# Patient Record
Sex: FEMALE | Race: WHITE
[De-identification: names, ages, dates, MRNs, and addresses within clinical notes are randomized per-mention and may not be internally consistent; named-entity substitution may affect disease eponyms.]

---

## 2020-07-15 ENCOUNTER — HOSPITAL ENCOUNTER (OUTPATIENT)
Dept: HOSPITAL 47 - EC | Age: 35
Setting detail: OBSERVATION
LOS: 2 days | Discharge: LEFT BEFORE DISCHARGE | End: 2020-07-17
Attending: INTERNAL MEDICINE | Admitting: INTERNAL MEDICINE
Payer: COMMERCIAL

## 2020-07-15 VITALS — BODY MASS INDEX: 32.3 KG/M2

## 2020-07-15 DIAGNOSIS — Z81.8: ICD-10-CM

## 2020-07-15 DIAGNOSIS — Z20.828: ICD-10-CM

## 2020-07-15 DIAGNOSIS — F11.10: ICD-10-CM

## 2020-07-15 DIAGNOSIS — F17.200: ICD-10-CM

## 2020-07-15 DIAGNOSIS — Z83.3: ICD-10-CM

## 2020-07-15 DIAGNOSIS — L03.113: ICD-10-CM

## 2020-07-15 DIAGNOSIS — F41.9: ICD-10-CM

## 2020-07-15 DIAGNOSIS — J45.909: ICD-10-CM

## 2020-07-15 DIAGNOSIS — Z87.42: ICD-10-CM

## 2020-07-15 DIAGNOSIS — Z82.49: ICD-10-CM

## 2020-07-15 DIAGNOSIS — F90.9: ICD-10-CM

## 2020-07-15 DIAGNOSIS — E66.9: ICD-10-CM

## 2020-07-15 DIAGNOSIS — Z79.899: ICD-10-CM

## 2020-07-15 DIAGNOSIS — L02.413: Primary | ICD-10-CM

## 2020-07-15 DIAGNOSIS — Z83.1: ICD-10-CM

## 2020-07-15 DIAGNOSIS — F31.9: ICD-10-CM

## 2020-07-15 DIAGNOSIS — Z90.49: ICD-10-CM

## 2020-07-15 DIAGNOSIS — Z53.29: ICD-10-CM

## 2020-07-15 DIAGNOSIS — Z83.79: ICD-10-CM

## 2020-07-15 LAB
ALBUMIN SERPL-MCNC: 4.2 G/DL (ref 3.5–5)
ALP SERPL-CCNC: 79 U/L (ref 38–126)
ALT SERPL-CCNC: 31 U/L (ref 4–34)
ANION GAP SERPL CALC-SCNC: 6 MMOL/L
AST SERPL-CCNC: 26 U/L (ref 14–36)
BASOPHILS # BLD AUTO: 0 K/UL (ref 0–0.2)
BASOPHILS NFR BLD AUTO: 0 %
BUN SERPL-SCNC: 10 MG/DL (ref 7–17)
CALCIUM SPEC-MCNC: 9.2 MG/DL (ref 8.4–10.2)
CHLORIDE SERPL-SCNC: 106 MMOL/L (ref 98–107)
CO2 SERPL-SCNC: 26 MMOL/L (ref 22–30)
EOSINOPHIL # BLD AUTO: 0.2 K/UL (ref 0–0.7)
EOSINOPHIL NFR BLD AUTO: 3 %
ERYTHROCYTE [DISTWIDTH] IN BLOOD BY AUTOMATED COUNT: 4.31 M/UL (ref 3.8–5.4)
ERYTHROCYTE [DISTWIDTH] IN BLOOD: 13 % (ref 11.5–15.5)
GLUCOSE SERPL-MCNC: 119 MG/DL (ref 74–99)
HCT VFR BLD AUTO: 38.4 % (ref 34–46)
HGB BLD-MCNC: 12.2 GM/DL (ref 11.4–16)
LYMPHOCYTES # SPEC AUTO: 1.6 K/UL (ref 1–4.8)
LYMPHOCYTES NFR SPEC AUTO: 28 %
MCH RBC QN AUTO: 28.3 PG (ref 25–35)
MCHC RBC AUTO-ENTMCNC: 31.7 G/DL (ref 31–37)
MCV RBC AUTO: 89.2 FL (ref 80–100)
MONOCYTES # BLD AUTO: 0.2 K/UL (ref 0–1)
MONOCYTES NFR BLD AUTO: 3 %
NEUTROPHILS # BLD AUTO: 3.6 K/UL (ref 1.3–7.7)
NEUTROPHILS NFR BLD AUTO: 64 %
PLATELET # BLD AUTO: 207 K/UL (ref 150–450)
POTASSIUM SERPL-SCNC: 4.3 MMOL/L (ref 3.5–5.1)
PROT SERPL-MCNC: 7.4 G/DL (ref 6.3–8.2)
SODIUM SERPL-SCNC: 138 MMOL/L (ref 137–145)
WBC # BLD AUTO: 5.6 K/UL (ref 3.8–10.6)

## 2020-07-15 PROCEDURE — 85652 RBC SED RATE AUTOMATED: CPT

## 2020-07-15 PROCEDURE — 80048 BASIC METABOLIC PNL TOTAL CA: CPT

## 2020-07-15 PROCEDURE — 87040 BLOOD CULTURE FOR BACTERIA: CPT

## 2020-07-15 PROCEDURE — 73090 X-RAY EXAM OF FOREARM: CPT

## 2020-07-15 PROCEDURE — 96366 THER/PROPH/DIAG IV INF ADDON: CPT

## 2020-07-15 PROCEDURE — 87070 CULTURE OTHR SPECIMN AEROBIC: CPT

## 2020-07-15 PROCEDURE — 83605 ASSAY OF LACTIC ACID: CPT

## 2020-07-15 PROCEDURE — 96361 HYDRATE IV INFUSION ADD-ON: CPT

## 2020-07-15 PROCEDURE — 36415 COLL VENOUS BLD VENIPUNCTURE: CPT

## 2020-07-15 PROCEDURE — 96375 TX/PRO/DX INJ NEW DRUG ADDON: CPT

## 2020-07-15 PROCEDURE — 10060 I&D ABSCESS SIMPLE/SINGLE: CPT

## 2020-07-15 PROCEDURE — 99284 EMERGENCY DEPT VISIT MOD MDM: CPT

## 2020-07-15 PROCEDURE — 0H9BXZX DRAINAGE OF RIGHT UPPER ARM SKIN, EXTERNAL APPROACH, DIAGNOSTIC: ICD-10-PCS

## 2020-07-15 PROCEDURE — 85025 COMPLETE CBC W/AUTO DIFF WBC: CPT

## 2020-07-15 PROCEDURE — 87205 SMEAR GRAM STAIN: CPT

## 2020-07-15 PROCEDURE — 80053 COMPREHEN METABOLIC PANEL: CPT

## 2020-07-15 PROCEDURE — 96372 THER/PROPH/DIAG INJ SC/IM: CPT

## 2020-07-15 PROCEDURE — 96365 THER/PROPH/DIAG IV INF INIT: CPT

## 2020-07-15 PROCEDURE — 86140 C-REACTIVE PROTEIN: CPT

## 2020-07-15 PROCEDURE — 81025 URINE PREGNANCY TEST: CPT

## 2020-07-15 PROCEDURE — 96374 THER/PROPH/DIAG INJ IV PUSH: CPT

## 2020-07-15 PROCEDURE — 96367 TX/PROPH/DG ADDL SEQ IV INF: CPT

## 2020-07-15 RX ADMIN — HEPARIN SODIUM SCH UNIT: 5000 INJECTION, SOLUTION INTRAVENOUS; SUBCUTANEOUS at 23:35

## 2020-07-15 RX ADMIN — HYDROCODONE BITARTRATE AND ACETAMINOPHEN PRN EACH: 5; 325 TABLET ORAL at 17:50

## 2020-07-15 RX ADMIN — HYDROCODONE BITARTRATE AND ACETAMINOPHEN PRN EACH: 5; 325 TABLET ORAL at 23:37

## 2020-07-15 RX ADMIN — CEFAZOLIN SCH MLS/HR: 330 INJECTION, POWDER, FOR SOLUTION INTRAMUSCULAR; INTRAVENOUS at 15:47

## 2020-07-15 NOTE — XR
EXAMINATION TYPE: XR forearm bilateral

 

DATE OF EXAM: 7/15/2020

 

CLINICAL HISTORY: pain

 

TECHNIQUE:  Frontal and lateral  images of the left forearm are obtained.

 

COMPARISON: None.

 

FINDINGS:  There is no acute fracture/dislocation evident. The joint spaces appear within normal limi
ts. Sliver radiopaque foreign body ventral soft tissues approximately 6 cm from the elbow joint.

 

IMPRESSION:  

There is no acute fracture or dislocation.

 

ICD 10 NO FRACTURE, INITIAL EVALUATION

 

EXAMINATION TYPE: XR forearm bilateral

 

DATE OF EXAM: 7/15/2020

 

CLINICAL HISTORY: pain

 

TECHNIQUE:  Frontal and lateral  images of the right forearm are obtained.

 

COMPARISON: None.

 

FINDINGS:  There is no acute fracture/dislocation evident. The joint spaces appear within normal limi
ts.  The overlying soft tissue appears unremarkable.

 

IMPRESSION:  

There is no acute fracture or dislocation.

 

ICD 10 NO FRACTURE, INITIAL EVALUATION

## 2020-07-15 NOTE — ED
Skin/Abscess/FB HPI





- General


Chief complaint: Skin/Abscess/Foreign Body


Stated complaint: FB in arm


Time Seen by Provider: 07/15/20 11:55


Source: patient


Mode of arrival: wheelchair


Limitations: no limitations





- History of Present Illness


Initial comments: 





A shows a 34-year-old female past medical history of IVDA who presents to the 

emergency department with reported once her bilateral upper extremities.  States

she has had multiple open wounds, the largest of which is on the right forearm. 

3 days ago she went into [facility] and was placed on Keflex and Bactrim.  

She states that she took the medications for 1 day and woke up to the wounds 

getting larger in size.  She then went back into [facility].  States that 

they were going to admitted to the hospital for failed outpatient treatment.  

She states that she was poorly treated there, therefore left before being 

admitted to the floor.  She does admit to recent heroin use.  Denies any fevers 

or chills.  Does have mild pustular drainage from the sites.  Denies any nausea 

or vomiting.  No other alleviating, precipitating or modifying factors





- Related Data


                                Home Medications











 Medication  Instructions  Recorded  Confirmed


 


ALPRAZolam [Xanax] 0.25 mg PO BID PRN 07/15/20 07/15/20


 


ARIPiprazole [Abilify] 10 mg PO DAILY 07/15/20 07/15/20


 


Acetaminophen Tab [Tylenol] 1,000 mg PO Q4H PRN 07/15/20 07/15/20


 


FLUoxetine HCL [PROzac] 20 mg PO DAILY 07/15/20 07/15/20


 


Ibuprofen [Motrin Ib] 800 mg PO Q4H PRN 07/15/20 07/15/20








                                  Previous Rx's











 Medication  Instructions  Recorded


 


Sulfamethox-Tmp 800-160Mg [Bactrim 1 tab PO Q12HR 5 Days #12 tab 07/17/20





-160 mg]  











                                    Allergies











Allergy/AdvReac Type Severity Reaction Status Date / Time


 


No Known Allergies Allergy   Verified 07/15/20 13:05














Review of Systems


ROS Statement: 


Those systems with pertinent positive or pertinent negative responses have been 

documented in the HPI.





ROS Other: All systems not noted in ROS Statement are negative.





Past Medical History


Past Medical History: Asthma


History of Any Multi-Drug Resistant Organisms: None Reported


Past Surgical History: Cholecystectomy


Past Psychological History: ADD/ADHD, Anxiety, Bipolar, Depression


Smoking Status: Current every day smoker


Past Alcohol Use History: None Reported


Past Drug Use History: Heroin





- Past Family History


  ** Mother


Family Medical History: Hypertension


Additional Family Medical History / Comment(s): Anxiety





  ** Father


Family Medical History: Diabetes Mellitus, Liver Disease


Additional Family Medical History / Comment(s): Hepatitis C





General Exam


Limitations: no limitations


General appearance: alert, in no apparent distress


Head exam: Present: atraumatic, normocephalic, normal inspection


Eye exam: Present: normal appearance, PERRL, EOMI.  Absent: scleral icterus, 

conjunctival injection, periorbital swelling


ENT exam: Present: normal exam, mucous membranes moist


Neck exam: Present: normal inspection.  Absent: tenderness, meningismus, 

lymphadenopathy


Respiratory exam: Present: normal lung sounds bilaterally.  Absent: respiratory 

distress, wheezes, rales, rhonchi, stridor


Cardiovascular Exam: Present: regular rate, normal rhythm, normal heart sounds. 

Absent: systolic murmur, diastolic murmur, rubs, gallop, clicks


GI/Abdominal exam: Present: soft, normal bowel sounds.  Absent: distended, 

tenderness, guarding, rebound, rigid


Extremities exam: Present: full ROM, tenderness, normal capillary refill, other 

(Multiple open wounds to the bilateral anterior forearms.  Patient has an area 

of erythema over the anterior right mid forearm.  Central area of scab.  No 

active drainage from this site.  No fluctuance currently.  No lymphatic 

streaking. No joint involvement).  Absent: pedal edema, joint swelling, calf 

tenderness


Back exam: Present: normal inspection


Neurological exam: Present: alert, oriented X3, CN II-XII intact


Psychiatric exam: Present: normal affect, normal mood


Skin exam: Present: warm, dry, intact, normal color.  Absent: rash





Course


                                   Vital Signs











  07/15/20 07/15/20





  11:54 15:28


 


Temperature 98.7 F 


 


Pulse Rate 94 82


 


Respiratory 18 16





Rate  


 


Blood Pressure 123/79 123/65


 


O2 Sat by Pulse 98 99





Oximetry  














Procedures





- Incision & Drainage


Consent Obtained: verbal consent


Site: upper extremity


Anesthetic Used: lidocaine 1%


Amount (mLs): 6


I&D Cleaning Method: Chloroprep


Sterile Field Used?: Yes


Needle Aspiration Performed?: No


Irrigation Performed?: No


I&D Drainage Obtained: Pus


Culture Obtained?: Yes


Patient Tolerated Procedure: well, no complications





Medical Decision Making





- Medical Decision Making





Upon arrival the patient is placed in room 19.  A thorough history and physical 

exam was performed.  Peripheral IV was established.  Laboratory studies were 

obtained including blood cultures.  Patient initiated on Unasyn and Vanco.  The 

patient's right forearm abscess was incised.  Timeout performed at 15:05. Wound 

culture obtained.  Patient will be admitted for failed outpatient treatment.  I 

discussed the case with Dr. Hlae who accepted admission.  She was then 

transferred to the floor





- Lab Data


Result diagrams: 


                                 07/16/20 08:14





                                 07/16/20 08:14


                                   Lab Results











  07/15/20 07/15/20 07/15/20 Range/Units





  13:35 13:54 13:54 


 


WBC   5.6   (3.8-10.6)  k/uL


 


RBC   4.31   (3.80-5.40)  m/uL


 


Hgb   12.2   (11.4-16.0)  gm/dL


 


Hct   38.4   (34.0-46.0)  %


 


MCV   89.2   (80.0-100.0)  fL


 


MCH   28.3   (25.0-35.0)  pg


 


MCHC   31.7   (31.0-37.0)  g/dL


 


RDW   13.0   (11.5-15.5)  %


 


Plt Count   207   (150-450)  k/uL


 


Neutrophils %   64   %


 


Lymphocytes %   28   %


 


Monocytes %   3   %


 


Eosinophils %   3   %


 


Basophils %   0   %


 


Neutrophils #   3.6   (1.3-7.7)  k/uL


 


Lymphocytes #   1.6   (1.0-4.8)  k/uL


 


Monocytes #   0.2   (0-1.0)  k/uL


 


Eosinophils #   0.2   (0-0.7)  k/uL


 


Basophils #   0.0   (0-0.2)  k/uL


 


ESR   32 H   (0-20)  mm/hr


 


Sodium    138  (137-145)  mmol/L


 


Potassium    4.3  (3.5-5.1)  mmol/L


 


Chloride    106  ()  mmol/L


 


Carbon Dioxide    26  (22-30)  mmol/L


 


Anion Gap    6  mmol/L


 


BUN    10  (7-17)  mg/dL


 


Creatinine    0.42 L  (0.52-1.04)  mg/dL


 


Est GFR (CKD-EPI)AfAm    >90  (>60 ml/min/1.73 sqM)  


 


Est GFR (CKD-EPI)NonAf    >90  (>60 ml/min/1.73 sqM)  


 


Glucose    119 H  (74-99)  mg/dL


 


Plasma Lactic Acid Daniel  1.2    (0.7-2.0)  mmol/L


 


Calcium    9.2  (8.4-10.2)  mg/dL


 


Total Bilirubin    0.2  (0.2-1.3)  mg/dL


 


AST    26  (14-36)  U/L


 


ALT    31  (4-34)  U/L


 


Alkaline Phosphatase    79  ()  U/L


 


C-Reactive Protein    30.5 H  (<10.0)  mg/L


 


Total Protein    7.4  (6.3-8.2)  g/dL


 


Albumin    4.2  (3.5-5.0)  g/dL














Disposition


Clinical Impression: 


 Cellulitis of arm, right, Abscess, Intravenous drug abuse





Disposition: ADMITTED AS IP TO THIS [facility]


Condition: Stable


Is patient prescribed a controlled substance at d/c from ED?: No


Decision to Admit Reason: Admit from EC


Decision Date: 07/15/20


Decision Time: 14:31

## 2020-07-16 LAB
ANION GAP SERPL CALC-SCNC: 6 MMOL/L
BASOPHILS # BLD AUTO: 0 K/UL (ref 0–0.2)
BASOPHILS NFR BLD AUTO: 0 %
BUN SERPL-SCNC: 6 MG/DL (ref 7–17)
CALCIUM SPEC-MCNC: 8.8 MG/DL (ref 8.4–10.2)
CHLORIDE SERPL-SCNC: 110 MMOL/L (ref 98–107)
CO2 SERPL-SCNC: 21 MMOL/L (ref 22–30)
EOSINOPHIL # BLD AUTO: 0.3 K/UL (ref 0–0.7)
EOSINOPHIL NFR BLD AUTO: 4 %
ERYTHROCYTE [DISTWIDTH] IN BLOOD BY AUTOMATED COUNT: 4.05 M/UL (ref 3.8–5.4)
ERYTHROCYTE [DISTWIDTH] IN BLOOD: 12.8 % (ref 11.5–15.5)
GLUCOSE SERPL-MCNC: 115 MG/DL (ref 74–99)
HCT VFR BLD AUTO: 35.4 % (ref 34–46)
HGB BLD-MCNC: 12.1 GM/DL (ref 11.4–16)
LYMPHOCYTES # SPEC AUTO: 1.8 K/UL (ref 1–4.8)
LYMPHOCYTES NFR SPEC AUTO: 28 %
MCH RBC QN AUTO: 29.8 PG (ref 25–35)
MCHC RBC AUTO-ENTMCNC: 34.1 G/DL (ref 31–37)
MCV RBC AUTO: 87.4 FL (ref 80–100)
MONOCYTES # BLD AUTO: 0.1 K/UL (ref 0–1)
MONOCYTES NFR BLD AUTO: 2 %
NEUTROPHILS # BLD AUTO: 4.1 K/UL (ref 1.3–7.7)
NEUTROPHILS NFR BLD AUTO: 64 %
PLATELET # BLD AUTO: 259 K/UL (ref 150–450)
POTASSIUM SERPL-SCNC: 4.5 MMOL/L (ref 3.5–5.1)
SODIUM SERPL-SCNC: 137 MMOL/L (ref 137–145)
WBC # BLD AUTO: 6.4 K/UL (ref 3.8–10.6)

## 2020-07-16 RX ADMIN — CEFAZOLIN SCH MLS/HR: 330 INJECTION, POWDER, FOR SOLUTION INTRAMUSCULAR; INTRAVENOUS at 00:12

## 2020-07-16 RX ADMIN — HYDROCODONE BITARTRATE AND ACETAMINOPHEN PRN EACH: 5; 325 TABLET ORAL at 10:55

## 2020-07-16 RX ADMIN — HEPARIN SODIUM SCH UNIT: 5000 INJECTION, SOLUTION INTRAVENOUS; SUBCUTANEOUS at 08:25

## 2020-07-16 RX ADMIN — SODIUM CHLORIDE SCH MLS/HR: 9 INJECTION, SOLUTION INTRAVENOUS at 08:25

## 2020-07-16 RX ADMIN — HEPARIN SODIUM SCH UNIT: 5000 INJECTION, SOLUTION INTRAVENOUS; SUBCUTANEOUS at 16:33

## 2020-07-16 RX ADMIN — CEFAZOLIN SCH: 330 INJECTION, POWDER, FOR SOLUTION INTRAMUSCULAR; INTRAVENOUS at 20:13

## 2020-07-16 RX ADMIN — CEFAZOLIN SCH: 330 INJECTION, POWDER, FOR SOLUTION INTRAMUSCULAR; INTRAVENOUS at 18:17

## 2020-07-16 RX ADMIN — SODIUM CHLORIDE SCH MLS/HR: 9 INJECTION, SOLUTION INTRAVENOUS at 00:11

## 2020-07-16 RX ADMIN — METHADONE HYDROCHLORIDE SCH MG: 10 TABLET ORAL at 14:01

## 2020-07-16 RX ADMIN — HYDROCODONE BITARTRATE AND ACETAMINOPHEN PRN EACH: 5; 325 TABLET ORAL at 04:47

## 2020-07-16 NOTE — P.HPIM
History of Present Illness


H&P Date: 07/15/20


Chief Complaint: Right forearm swelling





Patient is a 34-year-old female with a known history of IVDU, ongoing nicotine 

addiction, ADD/ADHD, anxiety/depression, bipolar disorder came to ER with 

complaints of right forearm.  Patient states that she went to [facility] and was started on antibiotics in the form of Keflex and Bactrim about 

3 days ago.  Patient took her medication for 1 day and felt like swelling is 

getting worse and wounds are getting larger and went back to [facility].  

Patient was being admitted to the hospital but patient left AMA saying that she 

was not treated well there.  Patient presented to the hospital ER.


Otherwise patient denied any complaints of fever or chills.  Complains of pain. 

Patient admits heroin use.


Patient was found to have cholangitis from the right forearm wound and I&D was 

done in the ER.





Patient denied any complaints of chest pain or shortness of breath.  No nausea 

vomiting or abdominal pain or diarrhea.  No cough or sputum production.





Forearm x-ray right showed no acute fracture or dislocation.  Overlying soft 

tissue appears unremarkable.





Lab data showed WBC 5.6, hemoglobin 12.2 and platelets 207


Sodium 138, potassium 4.3 and BUN 10 creatinine 0.42 CRP 30.5


Lactic acidosis 1.2





Patient has been afebrile on admission.








Review of Systems





Constitutional: Patient denies any fever or chills .  No generalized weakness or

weight loss.  


Abdomen: Patient denied nausea vomiting and diarrhea and abdominal pain.


Cardiovascular: Patient denies any chest pain or short of breath no 

palpitations.


Respiratory: patient denied any cough is from production.  No shortness of 

breath


Neurologic: Patient denied any numbness or tingling headache.


Musculoskeletal: Patient denies any complaints of joint swelling or deformity. n

Right forearm swelling and pain.


Skin: Negative


Psychiatric: Negative


Endocrine: No heat or cold intolerance.  No recent weight gain.


Genitourinary: No dysuria or hematuria.


All other 14 point ROS negative except the above


Constitutional: Reports as per HPI





Past Medical History


Past Medical History: Asthma


Additional Past Medical History / Comment(s): Asthma as a child, ovarian cysts


History of Any Multi-Drug Resistant Organisms: None Reported


Past Surgical History: Cholecystectomy


Past Anesthesia/Blood Transfusion Reactions: No Reported Reaction


Smoking Status: Light tobacco smoker





- Past Family History


  ** Mother


Family Medical History: Hypertension


Additional Family Medical History / Comment(s): Anxiety





  ** Father


Family Medical History: Diabetes Mellitus, Liver Disease


Additional Family Medical History / Comment(s): Hepatitis C





Medications and Allergies


                                Home Medications











 Medication  Instructions  Recorded  Confirmed  Type


 


ALPRAZolam [Xanax] 0.25 mg PO BID PRN 07/15/20 07/15/20 History


 


ARIPiprazole [Abilify] 10 mg PO DAILY 07/15/20 07/15/20 History


 


Acetaminophen Tab [Tylenol Tab] 1,000 mg PO Q4H PRN 07/15/20 07/15/20 History


 


FLUoxetine HCL [PROzac] 20 mg PO DAILY 07/15/20 07/15/20 History


 


Ibuprofen [Motrin Ib] 800 mg PO Q4H PRN 07/15/20 07/15/20 History








                                    Allergies











Allergy/AdvReac Type Severity Reaction Status Date / Time


 


No Known Allergies Allergy   Verified 07/15/20 13:05














Physical Exam


Vitals: 


                                   Vital Signs











  Temp Pulse Pulse Resp BP BP Pulse Ox


 


 07/15/20 16:46  98.0 F   82  17   118/80  97


 


 07/15/20 15:28   82   16  123/65   99


 


 07/15/20 11:54  98.7 F  94   18  123/79   98








                                Intake and Output











 07/15/20 07/15/20 07/15/20





 06:59 14:59 22:59


 


Other:   


 


  Weight  90.718 kg 90.718 kg














PHYSICAL EXAMINATION: 


Patient is lying in the bed comfortably, no acute distress, awake alert and 

oriented.. 


HEENT: Normocephalic. Neck is supple. Pupils reactive. Nostrils clear. Oral 

cavity is moist. Ears reveal no drainage. 


Neck reveals no JVD, carotid bruits, or thyromegaly. 


CHEST EXAMINATION: Trachea is central. Symmetrical expansion. Lung fields clear 

to auscultation and percussion. 


CARDIAC: Normal S1, S2 with no gallops. No murmurs 


ABDOMEN: Soft. Bowel sounds normal. No organomegaly. No abdominal bruits. 


Extremities: reveal no edema.  No clubbing or cyanosis


Neurologically awake, alert, oriented x3 with well-coordinated movements.  No 

focal deficits noted


Skin: No rash or skin lesions. 


Psychiatric: Coperative.  Nonsuicidal


Musculoskeletal: No joint swelling or deformity.  Normal range of motion.


Right forearm swelling with open wound on the medial side of the elbow.  Redness

and warmth present.  Minimal tenderness.  No other fluid collections noted.





Results


CBC & Chem 7: 


                                 07/15/20 13:54





                                 07/15/20 13:54


Labs: 


                  Abnormal Lab Results - Last 24 Hours (Table)











  07/15/20 07/15/20 Range/Units





  13:54 13:54 


 


ESR  32 H   (0-20)  mm/hr


 


Creatinine   0.42 L  (0.52-1.04)  mg/dL


 


Glucose   119 H  (74-99)  mg/dL


 


C-Reactive Protein   30.5 H  (<10.0)  mg/L














Thrombosis Risk Factor Assmnt





- DVT/VTE Prophylaxis


DVT/VTE Prophylaxis: Pharmacologic Prophylaxis ordered





- Choose All That Apply


Any of the Below Risk Factors Present?: Yes


Each Factor Represents 1 point: Obesity (BMI >25)


Other Risk Factors: No


Other congenital or acquired thrombophilia - If yes, enter type in comment: No


Thrombosis Risk Factor Assessment Total Risk Factor Score: 1


Thrombosis Risk Factor Assessment Level: Low Risk





Assessment and Plan


Assessment: 








Right forearm abscess with surrounding cellulitis.  Status post I&D in the ER.


IV drug use with heroin


Asthma stable


Anxiety depression bipolar disorder


DVT prophylaxis with heparin subcu


Obesity with BMI 32.3





Plan:


Patient will be continued on antibiotics of vancomycin and Unasyn dose was given

in the ER.  Follow-up blood culture report.  Continue the pain management.  

Further recommendations based on clinical course.





Patient was counseled extensively to quit using IV drugs.  Smoking cessation has

been counseled as well.


Time with Patient: Greater than 30

## 2020-07-17 VITALS — RESPIRATION RATE: 18 BRPM

## 2020-07-17 VITALS — DIASTOLIC BLOOD PRESSURE: 84 MMHG | TEMPERATURE: 98 F | SYSTOLIC BLOOD PRESSURE: 120 MMHG | HEART RATE: 83 BPM

## 2020-07-17 RX ADMIN — SODIUM CHLORIDE SCH: 9 INJECTION, SOLUTION INTRAVENOUS at 07:49

## 2020-07-17 RX ADMIN — SODIUM CHLORIDE SCH: 9 INJECTION, SOLUTION INTRAVENOUS at 04:28

## 2020-07-17 RX ADMIN — HEPARIN SODIUM SCH UNIT: 5000 INJECTION, SOLUTION INTRAVENOUS; SUBCUTANEOUS at 00:18

## 2020-07-17 RX ADMIN — HEPARIN SODIUM SCH UNIT: 5000 INJECTION, SOLUTION INTRAVENOUS; SUBCUTANEOUS at 08:00

## 2020-07-17 RX ADMIN — METHADONE HYDROCHLORIDE SCH MG: 10 TABLET ORAL at 08:00

## 2020-07-17 RX ADMIN — HYDROCODONE BITARTRATE AND ACETAMINOPHEN PRN EACH: 5; 325 TABLET ORAL at 00:29

## 2020-07-17 RX ADMIN — SODIUM CHLORIDE SCH: 9 INJECTION, SOLUTION INTRAVENOUS at 01:37

## 2020-07-17 NOTE — P.PN
Subjective


Progress Note Date: 07/16/20


Principal diagnosis: 





Right forearm abscess with surrounding cellulitis.








Patient is a 34-year-old female with a known history of IVDU, ongoing nicotine 

addiction, ADD/ADHD, anxiety/depression, bipolar disorder came to ER with 

complaints of right forearm.  Patient states that she went to [facility] and was started on antibiotics in the form of Keflex and Bactrim about 

3 days ago.  Patient took her medication for 1 day and felt like swelling is 

getting worse and wounds are getting larger and went back to [facility].  

Patient was being admitted to the hospital but patient left AMA saying that she 

was not treated well there.  Patient presented to the hospital ER.


Otherwise patient denied any complaints of fever or chills.  Complains of pain. 

Patient admits heroin use.


Patient was found to have cholangitis from the right forearm wound and I&D was 

done in the ER.





Patient denied any complaints of chest pain or shortness of breath.  No nausea 

vomiting or abdominal pain or diarrhea.  No cough or sputum production.





Forearm x-ray right showed no acute fracture or dislocation.  Overlying soft 

tissue appears unremarkable.





Lab data showed WBC 5.6, hemoglobin 12.2 and platelets 207


Sodium 138, potassium 4.3 and BUN 10 creatinine 0.42 CRP 30.5


Lactic acidosis 1.2





Patient has been afebrile on admission.





07/16/2017


Patient is currently lying in the bed comfortably.  Pain is controlled.  No 

further discharge from the right forearm wound.  Right forearm swelling is much 

improved.  Dressing changes be done.  Patient is being continued on vancomycin. 

Patient has been afebrile.  Currently pain is controlled with Norco 5.  Other

wise patient denied any nausea vomiting or abdominal pain.  No chest pain or 

shortness of breath.


Patient is being continued on methadone 10 mg daily.





Current medications reviewed.








Objective





- Vital Signs


Vital signs: 


                                   Vital Signs











Temp  98.1 F   07/16/20 15:00


 


Pulse  73   07/16/20 16:00


 


Resp  17   07/16/20 16:00


 


BP  125/80   07/16/20 15:00


 


Pulse Ox  99   07/16/20 15:00








                                 Intake & Output











 07/15/20 07/16/20 07/16/20





 18:59 06:59 18:59


 


Weight 90.718 kg  90.718 kg


 


Other:   


 


  Voiding Method  Toilet Toilet


 


  # Voids  1 1














- Exam





PHYSICAL EXAMINATION: 


Patient is lying in the bed comfortably, no acute distress, awake alert and 

oriented.. 


HEENT: Normocephalic. Neck is supple. Pupils reactive. Nostrils clear. Oral 

cavity is moist. Ears reveal no drainage. 


Neck reveals no JVD, carotid bruits, or thyromegaly. 


CHEST EXAMINATION: Trachea is central. Symmetrical expansion. Lung fields clear 

to auscultation and percussion. 


CARDIAC: Normal S1, S2 with no gallops. No murmurs 


ABDOMEN: Soft. Bowel sounds normal. No organomegaly. No abdominal bruits. 


Extremities: reveal no edema.  Right forearm abscess status post I&D no drainage

noted.  Swelling improved.  No clubbing or cyanosis


Neurologically awake, alert, oriented x3 with well-coordinated movements.  No 

focal deficits noted


Skin: No rash or skin lesions. 


Psychiatric: Coperative.  Nonsuicidal


Musculoskeletal: No joint swelling or deformity.  Normal range of motion.








- Labs


CBC & Chem 7: 


                                 07/16/20 08:14





                                 07/16/20 08:14


Labs: 


                  Abnormal Lab Results - Last 24 Hours (Table)











  07/16/20 Range/Units





  08:14 


 


Chloride  110 H  ()  mmol/L


 


Carbon Dioxide  21 L  (22-30)  mmol/L


 


BUN  6 L  (7-17)  mg/dL


 


Creatinine  0.35 L  (0.52-1.04)  mg/dL


 


Glucose  115 H  (74-99)  mg/dL








                      Microbiology - Last 24 Hours (Table)











 07/15/20 13:35 Blood Culture - Preliminary





 Blood    No Growth after 24 hours


 


 07/15/20 16:38 Gram Stain - Preliminary





 Arm - Right Wound Culture - Preliminary














Assessment and Plan


Assessment: 








Right forearm abscess with surrounding cellulitis.  Status post I&D in the ER.  

Follow up culture report.


IV drug use with heroin


Asthma stable


Anxiety depression bipolar disorder


DVT prophylaxis with heparin subcu


Obesity with BMI 32.3





Plan:


Patient will be continued on antibiotics of vancomycin and Unasyn dose was given

in the ER.  Blood cultures showed no growth.  Wound cultures are pending..  

Continue the pain management.  Further recommendations based on clinical course.





Patient was counseled extensively to quit using IV drugs.  Smoking cessation has

been counseled as well.


Time with Patient: Greater than 30

## 2020-07-20 NOTE — CDI
Documentation Clarification Form

Date: 7/20/20

From: Judi Lock

Phone: If you have a question about this query, please contact Eliane Ma, 
 at [phone] between 8am and 5pm.

Admit Date: 7/15/20

Discharge Date: 7/17/20

Patient Name: SHAWN BHAKTA

Visit Number:  [id number]



ATTENTION: The Clinical Documentation Specialists (CDI) and [facility] Coding Staff 
appreciate your assistance in clarifying documentation. Please respond to the 
clarification below the line at the bottom and electronically sign. The CDI & 
[facility] Coding staff will review the response and follow-up if needed. Please note: 
Queries are made part of the Legal Health Record. If you have any questions, 
please contact the author of this message via ITS.



Dear Dr. Mo Hale,



The diagnosis lactic acidosis was documented in the H&P and 719 PN.

History/Risk Factors:Abscess and cellulitis or right forearm, opioid abuse, 
bipolar

Clinical Indicators:Lab data showed WBC 5.6, hemoglobin 12.2 and platelets 207 
Sodium 138, potassium 4.3 and BUN 10 creatinine 0.42 CRP 30.5 Lactic acidosis 
1.2 





Please clarify if the lactic acidosis was



   Lactic acidosis as lab finding only

   Lactic acidosis

   Other, please specify _______________

   Clinically unable to determine

___________________________________________________________________________

  Lactic acidosis as lab finding only

MTDD

## 2020-07-28 NOTE — P.DS
Providers


Date of admission: 


07/15/20 15:07





Expected date of discharge: 07/17/20


Attending physician: 


Nicole Paul





Primary care physician: 


Stated None





Hospital Course: 





Discharge diagnosis.


Right forearm abscess with surrounding cellulitis.  Status post I&D in the ER.  

Follow up culture report.


IV drug use with heroin


Asthma stable


Anxiety depression bipolar disorder


DVT prophylaxis with heparin subcu


Obesity with BMI 32.3








Hospital course


Patient is a 34-year-old female with a known history of IVDU, ongoing nicotine 

addiction, ADD/ADHD, anxiety/depression, bipolar disorder came to ER with compl

aints of right forearm.  Patient states that she went to [facility]

and was started on antibiotics in the form of Keflex and Bactrim about 3 days 

ago.  Patient took her medication for 1 day and felt like swelling is getting 

worse and wounds are getting larger and went back to [facility].  Patient 

was being admitted to the hospital but patient left AMA saying that she was not 

treated well there.  Patient presented to the hospital ER.


Otherwise patient denied any complaints of fever or chills.  Complains of pain. 

Patient admits heroin use.


Patient was found to have cholangitis from the right forearm wound and I&D was 

done in the ER.





Patient denied any complaints of chest pain or shortness of breath.  No nausea 

vomiting or abdominal pain or diarrhea.  No cough or sputum production.





Forearm x-ray right showed no acute fracture or dislocation.  Overlying soft 

tissue appears unremarkable.





Lab data showed WBC 5.6, hemoglobin 12.2 and platelets 207


Sodium 138, potassium 4.3 and BUN 10 creatinine 0.42 CRP 30.5


Lactic acidosis 1.2





Patient has been afebrile on admission.





07/16/2017


Patient is currently lying in the bed comfortably.  Pain is controlled.  No 

further discharge from the right forearm wound.  Right forearm swelling is much 

improved.  Dressing changes be done.  Patient is being continued on vancomycin. 

Patient has been afebrile.  Currently pain is controlled with Norco 5.  

Otherwise patient denied any nausea vomiting or abdominal pain.  No chest pain 

or shortness of breath.


Patient is being continued on methadone 10 mg daily.





7/17/2020


Patient is awake alert oriented x3.  Right arm swelling is much improved.  

Cultures are pending.  Patient is being continued on antibiotics the home of.  

Patient is controlled with Norco.  No fever no chills.


Patient otherwise left AGAINST MEDICAL ADVICE.


Patient Condition at Discharge: Stable





Plan - Discharge Summary


Discharge Rx Participant: No


New Discharge Prescriptions: 


New


   Sulfamethox-Tmp 800-160Mg [Bactrim -160 mg] 1 tab PO Q12HR 5 Days #12 

tab





Continue


   Ibuprofen [Motrin Ib] 800 mg PO Q4H PRN


     PRN Reason: Pain


   Acetaminophen Tab [Tylenol] 1,000 mg PO Q4H PRN


     PRN Reason: Pain


   ALPRAZolam [Xanax] 0.25 mg PO BID PRN


     PRN Reason: Anxiety


   FLUoxetine HCL [PROzac] 20 mg PO DAILY


   ARIPiprazole [Abilify] 10 mg PO DAILY


Discharge Medication List





ALPRAZolam [Xanax] 0.25 mg PO BID PRN 07/15/20 [History]


ARIPiprazole [Abilify] 10 mg PO DAILY 07/15/20 [History]


Acetaminophen Tab [Tylenol] 1,000 mg PO Q4H PRN 07/15/20 [History]


FLUoxetine HCL [PROzac] 20 mg PO DAILY 07/15/20 [History]


Ibuprofen [Motrin Ib] 800 mg PO Q4H PRN 07/15/20 [History]


Sulfamethox-Tmp 800-160Mg [Bactrim -160 mg] 1 tab PO Q12HR 5 Days #12 tab 

07/17/20 [Rx]








Follow up Appointment(s)/Referral(s): 


None,Stated [Primary Care Provider] - 1-2 days


Discharge Disposition: Left Against Medical Advice

## 2021-08-31 ENCOUNTER — HOSPITAL ENCOUNTER (EMERGENCY)
Dept: HOSPITAL 47 - EC | Age: 36
Discharge: HOME | End: 2021-08-31
Payer: COMMERCIAL

## 2021-08-31 VITALS — RESPIRATION RATE: 16 BRPM | TEMPERATURE: 98.3 F

## 2021-08-31 VITALS — HEART RATE: 83 BPM | DIASTOLIC BLOOD PRESSURE: 80 MMHG | SYSTOLIC BLOOD PRESSURE: 118 MMHG

## 2021-08-31 DIAGNOSIS — F11.90: ICD-10-CM

## 2021-08-31 DIAGNOSIS — Z90.49: ICD-10-CM

## 2021-08-31 DIAGNOSIS — F17.200: ICD-10-CM

## 2021-08-31 DIAGNOSIS — F41.9: ICD-10-CM

## 2021-08-31 DIAGNOSIS — Z79.1: ICD-10-CM

## 2021-08-31 DIAGNOSIS — Z82.49: ICD-10-CM

## 2021-08-31 DIAGNOSIS — J45.909: ICD-10-CM

## 2021-08-31 DIAGNOSIS — N83.201: Primary | ICD-10-CM

## 2021-08-31 DIAGNOSIS — F31.9: ICD-10-CM

## 2021-08-31 DIAGNOSIS — Z83.3: ICD-10-CM

## 2021-08-31 DIAGNOSIS — N39.0: ICD-10-CM

## 2021-08-31 DIAGNOSIS — Z79.899: ICD-10-CM

## 2021-08-31 LAB
BASOPHILS # BLD AUTO: 0.1 K/UL (ref 0–0.2)
BASOPHILS NFR BLD AUTO: 1 %
EOSINOPHIL # BLD AUTO: 0.1 K/UL (ref 0–0.7)
EOSINOPHIL NFR BLD AUTO: 1 %
ERYTHROCYTE [DISTWIDTH] IN BLOOD BY AUTOMATED COUNT: 5.05 M/UL (ref 3.8–5.4)
ERYTHROCYTE [DISTWIDTH] IN BLOOD: 14.8 % (ref 11.5–15.5)
HCT VFR BLD AUTO: 46.2 % (ref 34–46)
HGB BLD-MCNC: 15.7 GM/DL (ref 11.4–16)
HYALINE CASTS UR QL AUTO: 1 /LPF (ref 0–2)
LYMPHOCYTES # SPEC AUTO: 1.4 K/UL (ref 1–4.8)
LYMPHOCYTES NFR SPEC AUTO: 12 %
MCH RBC QN AUTO: 31.2 PG (ref 25–35)
MCHC RBC AUTO-ENTMCNC: 34.1 G/DL (ref 31–37)
MCV RBC AUTO: 91.6 FL (ref 80–100)
MONOCYTES # BLD AUTO: 0.5 K/UL (ref 0–1)
MONOCYTES NFR BLD AUTO: 4 %
NEUTROPHILS # BLD AUTO: 9.8 K/UL (ref 1.3–7.7)
NEUTROPHILS NFR BLD AUTO: 82 %
PH UR: 6.5 [PH] (ref 5–8)
PLATELET # BLD AUTO: 153 K/UL (ref 150–450)
PROT UR QL: (no result)
RBC UR QL: 4 /HPF (ref 0–5)
SP GR UR: 1.03 (ref 1–1.03)
SQUAMOUS UR QL AUTO: 3 /HPF (ref 0–4)
UROBILINOGEN UR QL STRIP: 2 MG/DL (ref ?–2)
WBC # BLD AUTO: 12 K/UL (ref 3.8–10.6)
WBC # UR AUTO: 48 /HPF (ref 0–5)

## 2021-08-31 PROCEDURE — 74176 CT ABD & PELVIS W/O CONTRAST: CPT

## 2021-08-31 PROCEDURE — 81025 URINE PREGNANCY TEST: CPT

## 2021-08-31 PROCEDURE — 85025 COMPLETE CBC W/AUTO DIFF WBC: CPT

## 2021-08-31 PROCEDURE — 96374 THER/PROPH/DIAG INJ IV PUSH: CPT

## 2021-08-31 PROCEDURE — 96375 TX/PRO/DX INJ NEW DRUG ADDON: CPT

## 2021-08-31 PROCEDURE — 99284 EMERGENCY DEPT VISIT MOD MDM: CPT

## 2021-08-31 PROCEDURE — 96361 HYDRATE IV INFUSION ADD-ON: CPT

## 2021-08-31 PROCEDURE — 83605 ASSAY OF LACTIC ACID: CPT

## 2021-08-31 PROCEDURE — 81001 URINALYSIS AUTO W/SCOPE: CPT

## 2021-08-31 PROCEDURE — 36415 COLL VENOUS BLD VENIPUNCTURE: CPT

## 2021-08-31 PROCEDURE — 83690 ASSAY OF LIPASE: CPT

## 2021-08-31 PROCEDURE — 87086 URINE CULTURE/COLONY COUNT: CPT

## 2021-08-31 PROCEDURE — 93975 VASCULAR STUDY: CPT

## 2021-08-31 PROCEDURE — 76830 TRANSVAGINAL US NON-OB: CPT

## 2021-08-31 NOTE — CT
EXAMINATION TYPE: CT abdomen pelvis wo con

 

DATE OF EXAM: 8/31/2021

 

COMPARISON: None

 

HISTORY: RLQ pain.  history of ovarian cysts/endometriosis.  no prior on PACS

 

CT DLP: 922.6 mGycm

Automated exposure control for dose reduction was used.

 

Lung bases are clear. There is no pleural effusion. Heart size is normal. There is no pericardial eff
usion.

 

Liver spleen stomach pancreas appear intact. Bile ducts are nondilated. Gallbladder appears normal.

 

There is no adrenal mass. Kidneys have normal size. There is no hydronephrosis. Ureters are not dilat
ed. There is no retroperitoneal adenopathy. Appendix is posterior and appears normal. Bladder distend
s smoothly. There is 4.5 cm cyst on the right ovary. There is no free fluid in the pelvis. There is n
o inguinal hernia. Uterus is anteverted. Lumbar vertebra have normal spacing and alignment. Posterior
 elements are intact. There is no compression fracture. Bony pelvis appears normal. Hip joints appear
 normal.

 

There is no mesenteric edema. There is no ascites or free air. There is no bowel obstruction.

 

IMPRESSION:

Large cyst in the pelvis on the right side consistent with ovarian cyst. No free fluid. Normal append
ix.

## 2021-08-31 NOTE — US
EXAMINATION TYPE: US transvaginal

 

DATE OF EXAM: 8/31/2021

 

COMPARISON: CT 8/31/2021

 

CLINICAL HISTORY: R/O torsion. Right pelvic pain 

 

TECHNIQUE: .  Transvaginal sonographic images of the pelvis were acquired.  

 

Date of LMP:  Irregular cycles

 

EXAM MEASUREMENTS:

 

Uterus:  8.1 x 3.9 x 4.6 cm

Endometrial Stripe: 0.6 cm

Right Ovary:  7.2 x 8.1 x 3.9  cm

Left Ovary: 2.7 x 2.1 x 1.7 cm

 

 

 

1. Uterus:  Anteverted   wnl

2. Endometrium:  wnl

3. Right Ovary:  Complex cystic area visualized measuring 7 cm 

4. Left Ovary:  wnl

**Spectral, color and waveform doppler imaging shows good arterial and venous flow within the ovaries
; there is no evidence for ovarian torsion.

5. Bilateral Adnexa:  wnl

6. Posterior cul-de-sac:  Tiny amount of free fluid visualized 

 

 

 

IMPRESSION:

Large right side ovarian cyst with a some complex features. There are some internal echoes.

 

Normal uterus and endometrium. No evidence of ovarian torsion. Minimal free fluid.

## 2021-08-31 NOTE — ED
Abdominal Pain HPI





- General


Chief Complaint: Abdominal Pain


Stated Complaint: Abd Pain


Time Seen by Provider: 21 02:16


Source: patient, EMS


Mode of arrival: EMS


Limitations: no limitations





- History of Present Illness


MD Complaint: abdominal pain


Onset/Timin


-: minutes(s)


Location: RLQ


Radiation: none


Migration to: no migration


Severity: severe


Quality: sharp


Consistency: constant


Improves With: nothing


Worsens With: nothing


Associated Symptoms: nausea





- Related Data


                                Home Medications











 Medication  Instructions  Recorded  Confirmed


 


ALPRAZolam [Xanax] 0.25 mg PO BID PRN 07/15/20 07/15/20


 


ARIPiprazole [Abilify] 10 mg PO DAILY 07/15/20 07/15/20


 


Acetaminophen Tab [Tylenol] 1,000 mg PO Q4H PRN 07/15/20 07/15/20


 


FLUoxetine HCL [PROzac] 20 mg PO DAILY 07/15/20 07/15/20


 


Ibuprofen [Motrin Ib] 800 mg PO Q4H PRN 07/15/20 07/15/20








                                  Previous Rx's











 Medication  Instructions  Recorded


 


Sulfamethox-Tmp 800-160Mg [Bactrim 1 tab PO Q12HR 5 Days #12 tab 20





-160 mg]  


 


Ibuprofen [Motrin] 600 mg PO Q8HR PRN #20 tab 21


 


Sulfamethox-Tmp 800-160Mg [Bactrim 1 each PO Q12HR #6 tab 21





Ds]  











                                    Allergies











Allergy/AdvReac Type Severity Reaction Status Date / Time


 


No Known Allergies Allergy   Verified 21 02:22














Review of Systems


ROS Statement: 


Those systems with pertinent positive or pertinent negative responses have been 

documented in the HPI.





ROS Other: All systems not noted in ROS Statement are negative.


Constitutional: Denies: fever, chills


Respiratory: Denies: cough, dyspnea


Cardiovascular: Denies: chest pain, palpitations


Gastrointestinal: Reports: as per HPI, abdominal pain, nausea.  Denies: 

vomiting, diarrhea, constipation, melena, hematochezia


Genitourinary: Denies: dysuria, hematuria


Musculoskeletal: Denies: back pain


Skin: Denies: rash


Neurological: Denies: headache, weakness





Past Medical History


Past Medical History: Asthma


Additional Past Medical History / Comment(s): Asthma as a child, ovarian cysts


History of Any Multi-Drug Resistant Organisms: None Reported


Past Surgical History: Cholecystectomy


Past Anesthesia/Blood Transfusion Reactions: No Reported Reaction


Past Psychological History: ADD/ADHD, Anxiety, Bipolar, Depression


Smoking Status: Current every day smoker


Past Alcohol Use History: None Reported


Past Drug Use History: Heroin





- Past Family History


  ** Mother


Family Medical History: Hypertension


Additional Family Medical History / Comment(s): Anxiety





  ** Father


Family Medical History: Diabetes Mellitus, Liver Disease


Additional Family Medical History / Comment(s): Hepatitis C





General Exam


Limitations: no limitations


General appearance: alert, in distress (Secondary to abdominal pain)


Head exam: Present: atraumatic, normocephalic


Eye exam: Present: normal appearance.  Absent: scleral icterus, conjunctival 

injection


ENT exam: Present: normal oropharynx


Respiratory exam: Present: normal lung sounds bilaterally.  Absent: respiratory 

distress, wheezes, rales, rhonchi, stridor


Cardiovascular Exam: Present: regular rate, normal rhythm, normal heart sounds. 

Absent: systolic murmur, diastolic murmur, rubs, gallop


GI/Abdominal exam: Present: soft.  Absent: distended, tenderness, guarding, 

rebound, rigid, mass


Extremities exam: Present: normal inspection, normal capillary refill.  Absent: 

pedal edema, calf tenderness


Back exam: Present: normal inspection.  Absent: CVA tenderness (R), CVA te

nderness (L)


Neurological exam: Present: alert


Skin exam: Present: warm, intact, normal color, diaphoretic.  Absent: rash





Course


                                   Vital Signs











  21





  02:16 04:22


 


Temperature 98.3 F 


 


Pulse Rate 78 75


 


Respiratory 16 16





Rate  


 


Blood Pressure 127/75 131/79


 


O2 Sat by Pulse 97 95





Oximetry  














Medical Decision Making





- Lab Data


Result diagrams: 


                                 21 05:47





                                   Lab Results











  21 Range/Units





  03:04 03:04 05:47 


 


WBC    12.0 H  (3.8-10.6)  k/uL


 


RBC    5.05  (3.80-5.40)  m/uL


 


Hgb    15.7  (11.4-16.0)  gm/dL


 


Hct    46.2 H  (34.0-46.0)  %


 


MCV    91.6  (80.0-100.0)  fL


 


MCH    31.2  (25.0-35.0)  pg


 


MCHC    34.1  (31.0-37.0)  g/dL


 


RDW    14.8  (11.5-15.5)  %


 


Plt Count    153  (150-450)  k/uL


 


MPV    10.2  


 


Neutrophils %    82  %


 


Lymphocytes %    12  %


 


Monocytes %    4  %


 


Eosinophils %    1  %


 


Basophils %    1  %


 


Neutrophils #    9.8 H  (1.3-7.7)  k/uL


 


Lymphocytes #    1.4  (1.0-4.8)  k/uL


 


Monocytes #    0.5  (0-1.0)  k/uL


 


Eosinophils #    0.1  (0-0.7)  k/uL


 


Basophils #    0.1  (0-0.2)  k/uL


 


Hypochromasia    Slight  


 


Plasma Lactic Acid Daniel     (0.7-2.0)  mmol/L


 


Lipase     ()  U/L


 


Urine Color  Yellow    


 


Urine Appearance  Cloudy H    (Clear)  


 


Urine pH  6.5    (5.0-8.0)  


 


Ur Specific Gravity  1.029    (1.001-1.035)  


 


Urine Protein  1+ H    (Negative)  


 


Urine Glucose (UA)  Negative    (Negative)  


 


Urine Ketones  Trace H    (Negative)  


 


Urine Blood  Negative    (Negative)  


 


Urine Nitrite  Negative    (Negative)  


 


Urine Bilirubin  Negative    (Negative)  


 


Urine Urobilinogen  2.0    (<2.0)  mg/dL


 


Ur Leukocyte Esterase  Large H    (Negative)  


 


Urine RBC  4    (0-5)  /hpf


 


Urine WBC  48 H    (0-5)  /hpf


 


Ur Squamous Epith Cells  3    (0-4)  /hpf


 


Amorphous Sediment  Few H    (None)  /hpf


 


Urine Bacteria  Rare H    (None)  /hpf


 


Hyaline Casts  1    (0-2)  /lpf


 


Urine Mucus  Few H    (None)  /hpf


 


Urine HCG, Qual   Not Detected   (Not Detectd)  














  21 Range/Units





  05:47 05:47 


 


WBC    (3.8-10.6)  k/uL


 


RBC    (3.80-5.40)  m/uL


 


Hgb    (11.4-16.0)  gm/dL


 


Hct    (34.0-46.0)  %


 


MCV    (80.0-100.0)  fL


 


MCH    (25.0-35.0)  pg


 


MCHC    (31.0-37.0)  g/dL


 


RDW    (11.5-15.5)  %


 


Plt Count    (150-450)  k/uL


 


MPV    


 


Neutrophils %    %


 


Lymphocytes %    %


 


Monocytes %    %


 


Eosinophils %    %


 


Basophils %    %


 


Neutrophils #    (1.3-7.7)  k/uL


 


Lymphocytes #    (1.0-4.8)  k/uL


 


Monocytes #    (0-1.0)  k/uL


 


Eosinophils #    (0-0.7)  k/uL


 


Basophils #    (0-0.2)  k/uL


 


Hypochromasia    


 


Plasma Lactic Acid Daniel   1.1  (0.7-2.0)  mmol/L


 


Lipase  74   ()  U/L


 


Urine Color    


 


Urine Appearance    (Clear)  


 


Urine pH    (5.0-8.0)  


 


Ur Specific Gravity    (1.001-1.035)  


 


Urine Protein    (Negative)  


 


Urine Glucose (UA)    (Negative)  


 


Urine Ketones    (Negative)  


 


Urine Blood    (Negative)  


 


Urine Nitrite    (Negative)  


 


Urine Bilirubin    (Negative)  


 


Urine Urobilinogen    (<2.0)  mg/dL


 


Ur Leukocyte Esterase    (Negative)  


 


Urine RBC    (0-5)  /hpf


 


Urine WBC    (0-5)  /hpf


 


Ur Squamous Epith Cells    (0-4)  /hpf


 


Amorphous Sediment    (None)  /hpf


 


Urine Bacteria    (None)  /hpf


 


Hyaline Casts    (0-2)  /lpf


 


Urine Mucus    (None)  /hpf


 


Urine HCG, Qual    (Not Detectd)  














Disposition


Clinical Impression: 


 Abdominal pain, Ovarian cyst, Urinary tract infection





Disposition: HOME SELF-CARE


Condition: Good


Instructions (If sedation given, give patient instructions):  Ovarian Cyst (ED),

Urinary Tract Infection in Women (ED), Abdominal Pain (ED)


Prescriptions: 


Sulfamethox-Tmp 800-160Mg [Bactrim Ds] 1 each PO Q12HR #6 tab


Ibuprofen [Motrin] 600 mg PO Q8HR PRN #20 tab


 PRN Reason: Pain


Is patient prescribed a controlled substance at d/c from ED?: No


Referrals: 


None,Stated [Primary Care Provider] - 1-2 days

## 2021-11-13 ENCOUNTER — HOSPITAL ENCOUNTER (EMERGENCY)
Dept: HOSPITAL 47 - EC | Age: 36
LOS: 1 days | Discharge: HOME | End: 2021-11-14
Payer: COMMERCIAL

## 2021-11-13 VITALS — RESPIRATION RATE: 22 BRPM | TEMPERATURE: 98.3 F

## 2021-11-13 DIAGNOSIS — J45.909: ICD-10-CM

## 2021-11-13 DIAGNOSIS — T44.7X1A: Primary | ICD-10-CM

## 2021-11-13 DIAGNOSIS — Z90.49: ICD-10-CM

## 2021-11-13 DIAGNOSIS — F31.9: ICD-10-CM

## 2021-11-13 DIAGNOSIS — F90.9: ICD-10-CM

## 2021-11-13 DIAGNOSIS — F41.9: ICD-10-CM

## 2021-11-13 DIAGNOSIS — F17.200: ICD-10-CM

## 2021-11-13 PROCEDURE — 93005 ELECTROCARDIOGRAM TRACING: CPT

## 2021-11-13 PROCEDURE — 99284 EMERGENCY DEPT VISIT MOD MDM: CPT

## 2021-11-13 NOTE — ED
Overdose HPI





- General


Chief Complaint: Overdose


Stated Complaint: Accidental Overdose


Time Seen by Provider: 11/13/21 22:55


Source: patient, EMS


Mode of arrival: EMS





- Related Data


                                Home Medications











 Medication  Instructions  Recorded  Confirmed


 


FLUoxetine HCL [PROzac] 20 mg PO DAILY 07/15/20 08/31/21


 


Buprenorphine HCl/Naloxone HCl 1 tab SUBLINGUAL BID 08/31/21 08/31/21





[Zubsolv 5.7-1.4 mg Tablet Sl]   








                                  Previous Rx's











 Medication  Instructions  Recorded


 


Ibuprofen [Motrin] 600 mg PO Q8HR PRN #20 tab 08/31/21


 


Sulfamethox-Tmp 800-160Mg [Bactrim 1 each PO Q12HR #6 tab 08/31/21





Ds]  











                                    Allergies











Allergy/AdvReac Type Severity Reaction Status Date / Time


 


No Known Allergies Allergy   Verified 08/31/21 08:56














Review of Systems


ROS Statement: 


Those systems with pertinent positive or pertinent negative responses have been 

documented in the HPI.





ROS Other: All systems not noted in ROS Statement are negative.





Past Medical History


Past Medical History: Asthma


Additional Past Medical History / Comment(s): Asthma as a child, ovarian cysts


History of Any Multi-Drug Resistant Organisms: None Reported


Past Surgical History: Cholecystectomy


Past Anesthesia/Blood Transfusion Reactions: No Reported Reaction


Past Psychological History: ADD/ADHD, Anxiety, Bipolar, Depression


Smoking Status: Current every day smoker


Past Alcohol Use History: None Reported


Past Drug Use History: Heroin





- Past Family History


  ** Mother


Family Medical History: Hypertension


Additional Family Medical History / Comment(s): Anxiety





  ** Father


Family Medical History: Diabetes Mellitus, Liver Disease


Additional Family Medical History / Comment(s): Hepatitis C





Course


                                   Vital Signs











  11/13/21





  22:48


 


Temperature 98.3 F


 


Pulse Rate 101 H


 


Respiratory 22





Rate 


 


Blood Pressure 116/75


 


O2 Sat by Pulse 96





Oximetry 














Medical Decision Making





- EKG Data


-: EKG Interpreted by Me (EKG is sinus rhythm 95  QRS 92 )





Disposition


Clinical Impression: 


 Accidental drug overdose





Disposition: HOME SELF-CARE


Condition: Fair


Instructions (If sedation given, give patient instructions):  Adult Overdose 

(ED)


Is patient prescribed a controlled substance at d/c from ED?: No


Referrals: 


None,Stated [Primary Care Provider] - 1-2 days

## 2021-11-14 VITALS — SYSTOLIC BLOOD PRESSURE: 120 MMHG | HEART RATE: 85 BPM | DIASTOLIC BLOOD PRESSURE: 86 MMHG

## 2024-12-10 ENCOUNTER — HOSPITAL ENCOUNTER (EMERGENCY)
Dept: HOSPITAL 47 - EC | Age: 39
Discharge: HOME | End: 2024-12-10
Payer: COMMERCIAL

## 2024-12-10 VITALS
TEMPERATURE: 98.3 F | DIASTOLIC BLOOD PRESSURE: 84 MMHG | SYSTOLIC BLOOD PRESSURE: 120 MMHG | HEART RATE: 106 BPM | RESPIRATION RATE: 18 BRPM

## 2024-12-10 DIAGNOSIS — T40.1X1A: Primary | ICD-10-CM

## 2024-12-10 DIAGNOSIS — F17.200: ICD-10-CM

## 2024-12-10 PROCEDURE — 99284 EMERGENCY DEPT VISIT MOD MDM: CPT

## 2024-12-10 NOTE — ED
General Adult HPI





- General


Stated complaint: Overdose


Time Seen by Provider: 12/10/24 22:05


Source: patient, EMS, RN notes reviewed


Mode of arrival: EMS


Limitations: no limitations





- History of Present Illness


Initial comments: 


39-year-old female presents emerged part via EMS chief complaint of heroin 

overdose.  Patient states she normally snorts heroin states that she started to 

inject today states that she fell like she was an overdose so she used some 

Narcan.  Patient did have noted nausea vomiting.  Patient has been awake alert 

orientated for EMS.  Patient has no other complaints denies being suicidal








- Related Data


                                Home Medications











 Medication  Instructions  Recorded  Confirmed


 


FLUoxetine HCL [PROzac] 20 mg PO DAILY 07/15/20 08/31/21


 


Buprenorphine HCl/Naloxone HCl 1 tab SUBLINGUAL BID 08/31/21 08/31/21





[Zubsolv 5.7-1.4 mg Tablet Sl]   








                                  Previous Rx's











 Medication  Instructions  Recorded


 


Ibuprofen [Motrin] 600 mg PO Q8HR PRN #20 tab 08/31/21


 


Sulfamethox-Tmp 800-160Mg [Bactrim 1 each PO Q12HR #6 tab 08/31/21





Ds]  











                                    Allergies











Allergy/AdvReac Type Severity Reaction Status Date / Time


 


No Known Allergies Allergy   Verified 12/10/24 23:19














Review of Systems


ROS Statement: 


Those systems with pertinent positive or pertinent negative responses have been 

documented in the HPI.





ROS Other: All systems not noted in ROS Statement are negative.





Past Medical History


Past Medical History: Asthma


Additional Past Medical History / Comment(s): Asthma as a child, ovarian cysts


History of Any Multi-Drug Resistant Organisms: None Reported


Past Surgical History: Cholecystectomy


Past Anesthesia/Blood Transfusion Reactions: No Reported Reaction


Past Psychological History: ADD/ADHD, Anxiety, Bipolar, Depression


Smoking Status: Current every day smoker


Past Alcohol Use History: None Reported


Past Drug Use History: Heroin





- Past Family History


  ** Mother


Family Medical History: Hypertension


Additional Family Medical History / Comment(s): Anxiety





  ** Father


Family Medical History: Diabetes Mellitus, Liver Disease


Additional Family Medical History / Comment(s): Hepatitis C





General Exam


Limitations: no limitations


General appearance: alert, in no apparent distress


Head exam: Present: atraumatic, normocephalic, normal inspection


Eye exam: Present: normal appearance, PERRL, EOMI.  Absent: scleral icterus, 

conjunctival injection, periorbital swelling


ENT exam: Present: normal exam, mucous membranes moist


Neck exam: Present: normal inspection, full ROM.  Absent: tenderness, 

meningismus, lymphadenopathy


Respiratory exam: Present: normal lung sounds bilaterally.  Absent: respiratory 

distress, wheezes, rales, rhonchi, stridor


Cardiovascular Exam: Present: regular rate, normal rhythm, normal heart sounds. 

Absent: systolic murmur, diastolic murmur, rubs, gallop, clicks


GI/Abdominal exam: Present: soft, normal bowel sounds.  Absent: distended, 

tenderness, guarding, rebound, rigid





Course


                                   Vital Signs











  12/10/24





  23:15


 


Temperature 98.3 F


 


Pulse Rate 106 H


 


Respiratory 18





Rate 


 


Blood Pressure 120/84


 


O2 Sat by Pulse 97





Oximetry 














Medical Decision Making





- Medical Decision Making


Was pt. sent in by a medical professional or institution (ANAYELI Pace, NP, urgent 

care, hospital, or nursing home...) When possible be specific


@ -No


Did you speak to anyone other than the patient for history (EMS, parent, family,

police, friend...)? What history was obtained from this source 


@ -EMS regarding prehospital treatment


Did you review nursing and triage notes (agree or disagree)? Why? 


@ -I reviewed and agree with nursing and triage notes


Were old charts reviewed (outside hosp., previous admission, EMS record, old 

EKG, old radiological studies, urgent care reports/EKG's, nursing home records)?

Report findings 


@ -No old charts were reviewed


Differential Diagnosis (chest pain, altered mental status, abdominal pain women,

abdominal pain men, vaginal bleeding, weakness, fever, dyspnea, syncope, 

headache, dizziness, GI bleed, back pain, seizure, CVA, palpatations, mental 

health, musculoskeletal)? 


@ -[Drug overdose accidental overdose heroin abuse


EKG interpreted by me (3pts min.).


@ -None


X-rays interpreted by me (1pt min.).


@ -None done


CT interpreted by me (1pt min.).


@ -None done


U/S interpreted by me (1pt. min.).


@ -None done


What testing was considered but not performed or refused? (CT, X-rays, U/S, 

labs)? Why?


@ -None


What meds were considered but not given or refused? Why?


@ -None


Did you discuss the management of the patient with other professionals (bakari gifford idick Pace, PA, NP, lab, RT, psych nurse, , , teacher, 

, )? Give summary


@ -No


Was smoking cessation discussed for >3mins.?


@ -No


Was critical care preformed (if so, how long)?


@ -No


Were there social determinants of health that impacted care today? How? 

(Homelessness, low income, unemployed, alcoholism, drug addiction, 

transportation, low edu. Level, literacy, decrease access to med. care, [facility], 

rehab)?


@ -No


Was there de-escalation of care discussed even if they declined (Discuss DNR or 

withdrawal of care, Hospice)? DNR status


@ -No


What co-morbidities impacted this encounter? (DM, HTN, Smoking, COPD, CAD, 

Cancer, CVA, ARF, Chemo, Hep., AIDS, mental health diagnosis, sleep apnea, 

morbid obesity)?


@ -None


Was patient admitted / discharged? Hospital course, mention meds given and 

route, prescriptions, significant lab abnormalities, going to OR and other 

pertinent info.


@ -Discharged patient was observed for extensive period time patient is awake 

alert and orientated ambulating no signs distress she states she feels greatly 

improved will be discharged in stable condition.  Patient states she does have 

Narcan at home.


Undiagnosed new problem with uncertain prognosis?


@ -No


Drug Therapy requiring intensive monitoring for toxicity (Heparin, Nitro, 

Insulin, Cardizem)?


@ -No


Were any procedures done?


@ -No


Diagnosis/symptom?


@ -Accidental opiate overdose


Acute, or Chronic, or Acute on Chronic?


@ -acute


Uncomplicated (without systemic symptoms) or Complicated (systemic symptoms)?


@ -complicated


Side effects of treatment?


@ -No


Exacerbation, Progression, or Severe Exacerbation?


@ -No


Poses a threat to life or bodily function? How? (Chest pain, USA, MI, pneumonia,

 PE, COPD, DKA, ARF, appy, cholecystitis, CVA, Diverticulitis, Homicidal, 

Suicidal, threat to staff... and all critical care pts)


@ -yes drug overdose








Disposition


Clinical Impression: 


 Accidental drug overdose





Disposition: HOME SELF-CARE


Condition: Stable


Instructions (If sedation given, give patient instructions):  Adult Overdose 

(ED)


Additional Instructions: 


Please return to the Emergency Department if symptoms worsen or any other 

concerns.


Is patient prescribed a controlled substance at d/c from ED?: No


Referrals: 


None,Stated [Primary Care Provider] - 1-2 days


Time of Disposition: 23:49